# Patient Record
Sex: FEMALE | Race: WHITE | NOT HISPANIC OR LATINO | Employment: FULL TIME | URBAN - METROPOLITAN AREA
[De-identification: names, ages, dates, MRNs, and addresses within clinical notes are randomized per-mention and may not be internally consistent; named-entity substitution may affect disease eponyms.]

---

## 2017-02-17 ENCOUNTER — HISTORICAL (OUTPATIENT)
Dept: RADIOLOGY | Facility: HOSPITAL | Age: 37
End: 2017-02-17

## 2018-05-02 ENCOUNTER — HISTORICAL (OUTPATIENT)
Dept: RADIOLOGY | Facility: HOSPITAL | Age: 38
End: 2018-05-02

## 2019-10-31 ENCOUNTER — HISTORICAL (OUTPATIENT)
Dept: RADIOLOGY | Facility: HOSPITAL | Age: 39
End: 2019-10-31

## 2020-05-05 ENCOUNTER — HISTORICAL (OUTPATIENT)
Dept: ADMINISTRATIVE | Facility: HOSPITAL | Age: 40
End: 2020-05-05

## 2020-05-05 LAB
CRP SERPL-MCNC: 0.7 MG/DL
TSH SERPL-ACNC: 0.56 UIU/ML (ref 0.35–4.94)

## 2020-06-26 ENCOUNTER — HISTORICAL (OUTPATIENT)
Dept: LAB | Facility: HOSPITAL | Age: 40
End: 2020-06-26

## 2020-06-26 LAB — SARS-COV-2 RNA RESP QL NAA+PROBE: DETECTED

## 2020-07-02 ENCOUNTER — HISTORICAL (OUTPATIENT)
Dept: LAB | Facility: HOSPITAL | Age: 40
End: 2020-07-02

## 2020-08-20 ENCOUNTER — HISTORICAL (OUTPATIENT)
Dept: RADIOLOGY | Facility: HOSPITAL | Age: 40
End: 2020-08-20

## 2020-10-06 ENCOUNTER — HISTORICAL (OUTPATIENT)
Dept: LAB | Facility: HOSPITAL | Age: 40
End: 2020-10-06

## 2020-10-06 LAB
ALBUMIN SERPL-MCNC: 3.8 GM/DL (ref 3.4–5)
ALBUMIN/GLOB SERPL: 0.9 RATIO (ref 1.1–2)
ALP SERPL-CCNC: 51 UNIT/L (ref 46–116)
ALT SERPL-CCNC: 42 UNIT/L (ref 12–78)
AST SERPL-CCNC: 19 UNIT/L (ref 15–37)
BILIRUB SERPL-MCNC: 0.2 MG/DL (ref 0.2–1)
BILIRUBIN DIRECT+TOT PNL SERPL-MCNC: 0.09 MG/DL (ref 0–0.8)
BILIRUBIN DIRECT+TOT PNL SERPL-MCNC: 0.11 MG/DL (ref 0–0.2)
BUN SERPL-MCNC: 9.5 MG/DL (ref 7–18)
CALCIUM SERPL-MCNC: 9.5 MG/DL (ref 8.5–10.1)
CHLORIDE SERPL-SCNC: 104 MMOL/L (ref 98–107)
CO2 SERPL-SCNC: 26.5 MMOL/L (ref 21–32)
CREAT SERPL-MCNC: 0.64 MG/DL (ref 0.6–1.3)
ERYTHROCYTE [DISTWIDTH] IN BLOOD BY AUTOMATED COUNT: 12.2 % (ref 11.5–17)
GLOBULIN SER-MCNC: 4.1 GM/DL (ref 2.4–3.5)
GLUCOSE SERPL-MCNC: 94 MG/DL (ref 74–106)
HCT VFR BLD AUTO: 40.9 % (ref 37–47)
HGB BLD-MCNC: 13.6 GM/DL (ref 12–16)
MCH RBC QN AUTO: 29.5 PG (ref 27–31)
MCHC RBC AUTO-ENTMCNC: 33.3 GM/DL (ref 33–36)
MCV RBC AUTO: 88.7 FL (ref 80–94)
PLATELET # BLD AUTO: 385 X10(3)/MCL (ref 130–400)
PMV BLD AUTO: 8.6 FL (ref 9.4–12.4)
POTASSIUM SERPL-SCNC: 4.2 MMOL/L (ref 3.5–5.1)
PROT SERPL-MCNC: 7.9 GM/DL (ref 6.4–8.2)
RBC # BLD AUTO: 4.61 X10(6)/MCL (ref 4.2–5.4)
SODIUM SERPL-SCNC: 141 MMOL/L (ref 136–145)
WBC # SPEC AUTO: 5 X10(3)/MCL (ref 4.5–11.5)

## 2021-09-14 ENCOUNTER — HOSPITAL ENCOUNTER (OUTPATIENT)
Dept: NUTRITION | Facility: HOSPITAL | Age: 41
End: 2021-09-15
Attending: INTERNAL MEDICINE | Admitting: INTERNAL MEDICINE

## 2021-09-14 LAB
ABS NEUT (OLG): 3.62 X10(3)/MCL (ref 2.1–9.2)
ALBUMIN SERPL-MCNC: 4 GM/DL (ref 3.5–5)
ALBUMIN/GLOB SERPL: 0.9 RATIO (ref 1.1–2)
ALP SERPL-CCNC: 67 UNIT/L (ref 40–150)
ALT SERPL-CCNC: 44 UNIT/L (ref 0–55)
APTT PPP: 28.6 SECOND(S) (ref 23.2–33.7)
AST SERPL-CCNC: 27 UNIT/L (ref 5–34)
BASOPHILS # BLD AUTO: 0 X10(3)/MCL (ref 0–0.2)
BASOPHILS NFR BLD AUTO: 0 %
BILIRUB SERPL-MCNC: 0.4 MG/DL
BILIRUBIN DIRECT+TOT PNL SERPL-MCNC: 0.2 MG/DL (ref 0–0.5)
BILIRUBIN DIRECT+TOT PNL SERPL-MCNC: 0.2 MG/DL (ref 0–0.8)
BUN SERPL-MCNC: 9.1 MG/DL (ref 7–18.7)
CALCIUM SERPL-MCNC: 9.7 MG/DL (ref 8.4–10.2)
CHLORIDE SERPL-SCNC: 105 MMOL/L (ref 98–107)
CHOLEST SERPL-MCNC: 168 MG/DL
CHOLEST/HDLC SERPL: 3 {RATIO} (ref 0–5)
CK SERPL-CCNC: 66 U/L (ref 29–168)
CO2 SERPL-SCNC: 22 MMOL/L (ref 22–29)
CREAT SERPL-MCNC: 0.71 MG/DL (ref 0.55–1.02)
CRP SERPL HS-MCNC: 0.43 MG/DL
EOSINOPHIL # BLD AUTO: 0 X10(3)/MCL (ref 0–0.9)
EOSINOPHIL NFR BLD AUTO: 1 %
ERYTHROCYTE [DISTWIDTH] IN BLOOD BY AUTOMATED COUNT: 12.3 % (ref 11.5–17)
ERYTHROCYTE [SEDIMENTATION RATE] IN BLOOD: 22 MM/HR (ref 0–20)
EST. AVERAGE GLUCOSE BLD GHB EST-MCNC: 99.7 MG/DL
GLOBULIN SER-MCNC: 4.5 GM/DL (ref 2.4–3.5)
GLUCOSE SERPL-MCNC: 93 MG/DL (ref 74–100)
HBA1C MFR BLD: 5.1 %
HCT VFR BLD AUTO: 45.8 % (ref 37–47)
HDLC SERPL-MCNC: 49 MG/DL (ref 35–60)
HGB BLD-MCNC: 14.7 GM/DL (ref 12–16)
IMM GRANULOCYTES # BLD AUTO: 0.02 10*3/UL
IMM GRANULOCYTES NFR BLD AUTO: 0 %
INR PPP: 1 (ref 0–1.3)
LDLC SERPL CALC-MCNC: 103 MG/DL (ref 50–140)
LYMPHOCYTES # BLD AUTO: 1.9 X10(3)/MCL (ref 0.6–4.6)
LYMPHOCYTES NFR BLD AUTO: 32 %
MCH RBC QN AUTO: 29 PG (ref 27–31)
MCHC RBC AUTO-ENTMCNC: 32.1 GM/DL (ref 33–36)
MCV RBC AUTO: 90.3 FL (ref 80–94)
MONOCYTES # BLD AUTO: 0.4 X10(3)/MCL (ref 0.1–1.3)
MONOCYTES NFR BLD AUTO: 6 %
NEUTROPHILS # BLD AUTO: 3.62 X10(3)/MCL (ref 2.1–9.2)
NEUTROPHILS NFR BLD AUTO: 60 %
PLATELET # BLD AUTO: 280 X10(3)/MCL (ref 130–400)
PMV BLD AUTO: 10 FL (ref 9.4–12.4)
POTASSIUM SERPL-SCNC: 4.5 MMOL/L (ref 3.5–5.1)
PROT SERPL-MCNC: 8.5 GM/DL (ref 6.4–8.3)
PROTHROMBIN TIME: 12.4 SECOND(S) (ref 12.5–14.5)
RBC # BLD AUTO: 5.07 X10(6)/MCL (ref 4.2–5.4)
SARS-COV-2 AG RESP QL IA.RAPID: NEGATIVE
SODIUM SERPL-SCNC: 138 MMOL/L (ref 136–145)
TRIGL SERPL-MCNC: 78 MG/DL (ref 37–140)
TROPONIN I SERPL-MCNC: <0.01 NG/ML (ref 0–0.04)
VLDLC SERPL CALC-MCNC: 16 MG/DL
WBC # SPEC AUTO: 6 X10(3)/MCL (ref 4.5–11.5)

## 2021-09-15 LAB
ABS NEUT (OLG): 1.72 X10(3)/MCL (ref 2.1–9.2)
ALBUMIN SERPL-MCNC: 3.4 GM/DL (ref 3.5–5)
ALBUMIN/GLOB SERPL: 1 RATIO (ref 1.1–2)
ALP SERPL-CCNC: 59 UNIT/L (ref 40–150)
ALT SERPL-CCNC: 35 UNIT/L (ref 0–55)
AMPHET UR QL SCN: NEGATIVE
AST SERPL-CCNC: 21 UNIT/L (ref 5–34)
BARBITURATE SCN PRESENT UR: NEGATIVE
BASOPHILS # BLD AUTO: 0 X10(3)/MCL (ref 0–0.2)
BASOPHILS NFR BLD AUTO: 0 %
BENZODIAZ UR QL SCN: POSITIVE
BILIRUB SERPL-MCNC: 0.2 MG/DL
BILIRUBIN DIRECT+TOT PNL SERPL-MCNC: 0.1 MG/DL (ref 0–0.5)
BILIRUBIN DIRECT+TOT PNL SERPL-MCNC: 0.1 MG/DL (ref 0–0.8)
BUN SERPL-MCNC: 12.6 MG/DL (ref 7–18.7)
CALCIUM SERPL-MCNC: 8.8 MG/DL (ref 8.4–10.2)
CANNABINOIDS UR QL SCN: POSITIVE
CHLORIDE SERPL-SCNC: 107 MMOL/L (ref 98–107)
CO2 SERPL-SCNC: 22 MMOL/L (ref 22–29)
COCAINE UR QL SCN: NEGATIVE
CREAT SERPL-MCNC: 0.75 MG/DL (ref 0.55–1.02)
EOSINOPHIL # BLD AUTO: 0.1 X10(3)/MCL (ref 0–0.9)
EOSINOPHIL NFR BLD AUTO: 1 %
ERYTHROCYTE [DISTWIDTH] IN BLOOD BY AUTOMATED COUNT: 12.3 % (ref 11.5–17)
GLOBULIN SER-MCNC: 3.4 GM/DL (ref 2.4–3.5)
GLUCOSE SERPL-MCNC: 98 MG/DL (ref 74–100)
HCT VFR BLD AUTO: 39.3 % (ref 37–47)
HGB BLD-MCNC: 12.5 GM/DL (ref 12–16)
LYMPHOCYTES # BLD AUTO: 2.8 X10(3)/MCL (ref 0.6–4.6)
LYMPHOCYTES NFR BLD AUTO: 56 %
MCH RBC QN AUTO: 29.1 PG (ref 27–31)
MCHC RBC AUTO-ENTMCNC: 31.8 GM/DL (ref 33–36)
MCV RBC AUTO: 91.4 FL (ref 80–94)
MONOCYTES # BLD AUTO: 0.4 X10(3)/MCL (ref 0.1–1.3)
MONOCYTES NFR BLD AUTO: 9 %
NEUTROPHILS # BLD AUTO: 1.72 X10(3)/MCL (ref 2.1–9.2)
NEUTROPHILS NFR BLD AUTO: 34 %
OPIATES UR QL SCN: NEGATIVE
PCP UR QL: NEGATIVE
PH UR STRIP.AUTO: 6 [PH] (ref 5–7.5)
PLATELET # BLD AUTO: 347 X10(3)/MCL (ref 130–400)
PMV BLD AUTO: 8.9 FL (ref 9.4–12.4)
POTASSIUM SERPL-SCNC: 4.1 MMOL/L (ref 3.5–5.1)
PROT SERPL-MCNC: 6.8 GM/DL (ref 6.4–8.3)
RBC # BLD AUTO: 4.3 X10(6)/MCL (ref 4.2–5.4)
SODIUM SERPL-SCNC: 140 MMOL/L (ref 136–145)
SP GR FLD REFRACTOMETRY: 1.02 (ref 1–1.03)
WBC # SPEC AUTO: 5.1 X10(3)/MCL (ref 4.5–11.5)

## 2022-04-30 NOTE — ED PROVIDER NOTES
Patient:   Jose Chiu            MRN: [id number]            FIN: [id number]               Age:   41 years     Sex:  Female     :  1980   Associated Diagnoses:   Numbness in left leg; Arm numbness left   Author:   Anurag Lopez      Basic Information   Time seen: Date & time 2021 11:53:00.   History source: Patient.   Arrival mode: Private vehicle, wheelchair.   Additional information: Patient's physician(s): 1534: Assumed care MARY ANN EDWARDS.      History of Present Illness   The patient presents with 40 y/o F presents to the ED with dizziness onset this morning with associated left-sided numbness/tingling and nausea. Also c/o cough and headache. Denies chest pain, SOB, vomiting, fever. SAMANTHA Murguia.  The onset was 8am today .  The course/duration of symptoms is constant.  The character of symptoms is off-balance.  The degree at present is moderate.  The exacerbating factor is none.  The relieving factor is none.  Risk factors consist of none.  Prior episodes: none.  Therapy today: see nurses notes.  Associated symptoms: numbness to left arm and left leg.        Review of Systems   Constitutional symptoms:  No fever, no chills.    Respiratory symptoms:  No shortness of breath, no cough.    Cardiovascular symptoms:  No chest pain, no palpitations.    Gastrointestinal symptoms:  No vomiting, no diarrhea.    Musculoskeletal symptoms:  No back pain,    Neurologic symptoms:  Dizziness, numbness, no altered level of consciousness, no weakness.              Additional review of systems information: All other systems reviewed and otherwise negative.      Health Status   Allergies:    Allergic Reactions (Selected)  No Known Medication Allergies,    Allergies (1) Active Reaction  No Known Medication Allergies None Documented  .   Medications:  (Selected)   Prescriptions  Prescribed  Colace 100 mg oral capsule: 100 mg = 1 cap(s), Oral, BID, PRN PRN for constipation, # 60 cap(s), 0  Refill(s), Pharmacy: [facility] [place] #[id number]  GoLYTELY oral powder for reconstitution: 240 mL, Oral, Daily, drink 1/2 then wait 12 hours, if no BM then drink second 1/2, # 480 mL, 0 Refill(s), Pharmacy: [facility] #[id number]  Levsin 0.125 mg oral tablet: 0.125 mg = 1 tab(s), Oral, TID, PRN PRN for spasm, # 15 tab(s), 0 Refill(s), Pharmacy: [facility] #[id number], 160, cm, Height/Length Dosing, 20 8:42:00 CDT, 92, kg, Weight Dosing, 20 8:42:00 CDT  Zofran ODT 8 mg oral tablet, disintegratin mg = 1 tab(s), Oral, q8hr, PRN PRN nausea/vomiting, allow tablet to dissolve on tongue, # 15 tab(s), 0 Refill(s), Pharmacy: [facility] #[id number], 157, cm, Height/Length Dosing, 20 12:33:00 CDT, 88.5, kg, Weight Dosing, 20 12:33:00...  albuterol 2.5 mg/3 mL (0.083%) inhalation solution: 2.5 mg = 3 mL, NEB, q4hr Resp, # 540 mL, 0 Refill(s), Pharmacy: [facility] #[id number], 157.48, cm, Height/Length Dosing, 20 1:13:00 CST, 94.8, kg, Weight Dosing, 20 1:13:00 CST  docusate sodium 50 mg oral capsule: 50 mg = 1 cap(s), Oral, BID, PRN PRN for constipation, # 60 cap(s), 0 Refill(s), [id number], 157, cm, Height/Length Dosing, 10/23/20 16:04:00 CDT, 88, kg, Weight Dosing, 10/23/20 16:04:00 CDT  Documented Medications  Documented  DULoxetine 30 mg oral delayed release capsule: 90 mg = 3 cap(s), Oral, Daily, do not crush or chew, # 90 cap(s), 0 Refill(s)  DULoxetine 60 mg oral delayed release capsule: 60 mg = 1 cap(s), Oral, Daily  alPRAzolam 1 mg oral tablet: 1 mg = 1 tab(s), Oral, At Bedtime  amphetamine-dextroamphetamine 20 mg oral tablet: 10 mg = 0.5 tab(s), Oral, BID  cefdinir 300 mg oral capsule: 300 mg = 1 cap(s), Oral, BID  dextromethorphan-promethazine 15 mg-6.25 mg/5 mL oral syrup: 5 mL, Oral, q6hr  doxycycline hyclate 100 mg oral capsule: 100 mg = 1 cap(s), Oral, BID  methylPREDNISolone 4 mg oral tab: Oral, As Directed  oseltamivir 75 mg  oral capsule: 75 mg = 1 cap(s), Oral, BID, per nurse's notes.   Immunizations: Per nurse's notes.   Menstrual history: Per nurse's notes.      Past Medical/ Family/ Social History   Medical history:    Resolved  Anxiety ([id number]):  Resolved., Reviewed as documented in chart.   Surgical history:    No active procedure history items have been selected or recorded., Reviewed as documented in chart.   Family history:    No family history items have been selected or recorded., Reviewed as documented in chart.   Social history:    Social & Psychosocial Habits    Alcohol  01/01/2020  Use: Never    Tobacco  11/02/2019  Use: Never (less than 100 in l    Patient Wants Consult For Cessation Counseling N/A    01/01/2020  Use: Never (less than 100 in l    Patient Wants Consult For Cessation Counseling N/A    05/03/2020  Use: Never (less than 100 in l    Patient Wants Consult For Cessation Counseling N/A    06/28/2020  Use: Never (less than 100 in l    Patient Wants Consult For Cessation Counseling No    10/23/2020  Use: Never (less than 100 in l    Patient Wants Consult For Cessation Counseling N/A    09/14/2021  Use: Never (less than 100 in l    Patient Wants Consult For Cessation Counseling No    Abuse/Neglect  11/02/2019  SHX Any signs of abuse or neglect No    01/01/2020  SHX Any signs of abuse or neglect No    05/03/2020  SHX Any signs of abuse or neglect No    06/28/2020  SHX Any signs of abuse or neglect No    Feels unsafe at home: No    Safe place to go: Yes    10/23/2020  SHX Any signs of abuse or neglect No    09/14/2021  SHX Any signs of abuse or neglect No  , Reviewed as documented in chart.   Problem list:    Active Problems (2)  2019-nCoV   Obesity   , per nurse's notes.      Physical Examination               Vital Signs      No qualifying data available.   General:  Alert, no acute distress, well hydrated, Skin: Normal for ethnicity.    Skin:  Warm, dry, pink, intact.    Head:  Normocephalic.   Neck:  Supple, no  tenderness, full range of motion.    Eye:  Pupils are equal, round and reactive to light, extraocular movements are intact, normal conjunctiva.    Cardiovascular:  Regular rate and rhythm, No murmur, Normal peripheral perfusion.    Respiratory:  Lungs are clear to auscultation, breath sounds are equal, Respirations: not tachypneic, not labored, not shallow, Retractions: None.    Chest wall:  No tenderness.   Back:  Normal range of motion, Normal alignment, No costovertebral angle tenderness,    Musculoskeletal:  Normal ROM, normal strength, no swelling, no deformity.    Gastrointestinal:  Soft, Nontender, Non distended, Normal bowel sounds.    Neurological:  Alert and oriented to person, place, time, and situation, No focal neurological deficit observed, CN II-XII intact, normal sensory observed, normal motor observed, normal speech observed, normal coordination observed, Gait: Normal.    Psychiatric:  Cooperative, appropriate mood & affect, normal judgment.       Medical Decision Making   Documents reviewed:  Emergency department nurses' notes.   Orders  Launch Orders   Laboratory:  COVID-19  IDnow (Order): Stat collect, Nasal, 9/14/2021 11:58 CDT, Nurse collect  Troponin-I (Order): Stat collect, 9/14/2021 11:57 CDT, Blood, Lab Collect, Print Label By Order Location, 9/14/2021 11:57 CDT  CMP (Order): Stat collect, 9/14/2021 11:57 CDT, Blood, Lab Collect, Print Label By Order Location, 9/14/2021 11:57 CDT  CBC w/ Auto Diff (Order): Stat collect, 9/14/2021 11:57 CDT, Blood, Lab Collect, Print Label By Order Location, 9/14/2021 11:57 CDT  Radiology:  CT Head W/O Contrast (Order): Stat, 9/14/2021 11:58 CDT, Dizziness , vertigo, None, Stretcher, Rad Type, Schedule this test  CXR 1 View (Order): Stat, 9/14/2021 11:57 CDT, Shortness of Breath, None, Stretcher, Rad Type, Not Scheduled  Cardiology:  EKG (Order): 9/14/2021 11:57 CDT, Stat, Once, 9/14/2021 11:57 CDT, -1, -1, 9/14/2021 11:57 CDT, Launch Orders    Pharmacy:  aspirin 325 mg oral tablet (Order): 325 mg, form: Tab, Oral, Daily, first dose 9/14/2021 15:48 CDT, STAT, 4 chew tab = 5 grains  .    Electrocardiogram:  Time 9/14/2021 11:54:00, rate 94, normal sinus rhythm, No ST-T changes, no ectopy, normal MS & QRS intervals, EP Interp.    Results review:  Lab results : Lab View   9/14/2021 14:20 CDT      Sodium Lvl                138 mmol/L                             Potassium Lvl             4.5 mmol/L                             Chloride                  105 mmol/L                             CO2                       22 mmol/L                             Calcium Lvl               9.7 mg/dL                             Glucose Lvl               93 mg/dL                             BUN                       9.1 mg/dL                             Creatinine                0.71 mg/dL                             eGFR-AA                   >60  NA                             eGFR-FESTUS                  >60 mL/min/1.73 m2  NA                             Bili Total                0.4 mg/dL                             Bili Direct               0.2 mg/dL                             Bili Indirect             0.20 mg/dL                             AST                       27 unit/L                             ALT                       44 unit/L                             Alk Phos                  67 unit/L                             Total Protein             8.5 gm/dL  HI                             Albumin Lvl               4.0 gm/dL                             Globulin                  4.5 gm/dL  HI                             A/G Ratio                 0.9 ratio  LOW                             Troponin-I                <0.010 ng/mL                             WBC                       6.0 x10(3)/mcL                             RBC                       5.07 x10(6)/mcL                             Hgb                       14.7 gm/dL                             Hct                        45.8 %                             Platelet                  280 x10(3)/mcL                             MCV                       90.3 fL                             MCH                       29.0 pg                             MCHC                      32.1 gm/dL  LOW                             RDW                       12.3 %                             MPV                       10.0 fL                             Abs Neut                  3.62 x10(3)/mcL                             Neutro Auto               60 %  NA                             Lymph Auto                32 %  NA                             Mono Auto                 6 %  NA                             Eos Auto                  1 %  NA                             Abs Eos                   0.0 x10(3)/mcL                             Basophil Auto             0 %  NA                             Abs Neutro                3.62 x10(3)/mcL                             Abs Lymph                 1.9 x10(3)/mcL                             Abs Mono                  0.4 x10(3)/mcL                             Abs Baso                  0.0 x10(3)/mcL                             IG%                       0  NA                             IG#                       0.02  NA    9/14/2021 11:55 CDT      COVID-19 Rapid            NEGATIVE    ,    No qualifying data available, Interpretation Labs unremarkable.    Radiology results:  Rad Results (ST)  < 12 hrs   Accession: [id number]  Order: CT Head W/O Contrast  Report Dt/Tm: 09/14/2021 13:21  Report:   CT HEAD NONCONTRAST 9/14/2021     INDICATION: Dizziness , vertigo     TECHNIQUE: Multiple axial CT images were obtained from the cranial  vertex through the skull base without the administration of  intravenous contrast and reformatted in the coronal and sagittal  planes. Total  mGy*cm. Automated exposure control was utilized  for this examination as a radiation dose lowering technique.      COMPARISON: None available.        FINDINGS:  No acute hyperdense intracranial hemorrhage or abnormal  intra-axial/extra axial fluid collections. No focally hyperdense  intracranial vasculature, mass effect, or midline shift. The  craniocervical junction is within normal limits. No sulcal effacement  or focal loss of gray-white differentiation. The ventricular system is  normal in size and configuration. The basal cisterns are clear.     No acute fractures are identified of the calvarium or imaged facial  bones. Mild mucosal thickening of the bilateral sphenoid sinuses, left  greater than right. The remainder of the imaged paranasal sinuses,  mastoid air cells, and tympanic spaces are clear.        IMPRESSION:  No acute intracranial abnormalities.      Accession: [id number]  Order: XR Chest 1 View  Report Dt/Tm: 09/14/2021 12:24  Report:   Clinical History  Shortness of breath     Technique  Single view of the chest.     Comparison  6/28/2021     Findings  Lungs are clear with no visualized focal airspace opacity.  There is no evidence of pneumothorax or pleural effusion.  The cardiomediastinal silhouette is within normal limits.  No acute osseous abnormality.  The visualized abdomen is unremarkable.     Impression  No acute cardiopulmonary process.        .      Impression and Plan   Diagnosis   Numbness in left leg (IOD38-JL R20.0)   Arm numbness left (SMO63-MP R20.0)      Calls-Consults   -  9/14/2021 15:40:00 , Ramiro COOK, [place], recommends Tamanna accepts consult.    -  9/14/2021 15:53:00 , Kerri COOK, Zak PATEL, recommends Taina accepts admit.    Plan   Condition: Improved, Stable.    Disposition: Admit time  9/14/2021 15:55:00, Place in Observation Telemetry Unit.    Counseled: Patient, Family, Regarding diagnosis, Regarding diagnostic results, Regarding treatment plan, Patient indicated understanding of instructions.    Orders: Launch Orders   Consults:  Consult to Physician (Order): 9/14/2021 15:44 CDT,  "Ramiro COOK, Asma, TIA vs CVA, No  , Launch Orders   Admit/Transfer/Discharge:  Place in Outpatient Observation (Order): 9/14/2021 15:55 CDT, Remote Telemetry Kerri COOK, Zak PATEL, No, Neuro floor ok  .    Notes: Admitted in collaboration with Dr. Owen.       Addendum      Teaching-Supervisory Addendum-Brief   I participated in the following activities of this patients care: the medical history, the physical exam, medical decision making.   I personally performed: supervision of the patient's care, the medical history, the physical exam, the medical decision making.   The case was discussed with: the nurse practitioner.   Evaluation and management service: I agree with the evaluation and management decisions made in this patient's care.   Results interpretation: I agree with the study interpretation in this patient's care.   Notes: I conducted my own face-to-face evaluation of the patient and performed an independent history and physical examination of this patient. I discussed the MDM and reviewed the results with the NP.     Briefly, this is a 42 y/o F who presented tot he ED for evaluation of dizziness w/ "seeing spots", left sided upper and lower extremity numbness starting earlier today. No appreciable weakness or other focal neurologic deficit on examination. ED work up including CT head, EKG and labs unrevealing. Given unilateral symptoms concerning for possible TIA, case discussed with neurology who agreed w/ admission for further diagnostic evaluation. Findings and plan discussed with the patient, and she is agreeable to admission at this time.     Zena Owen MD  .   "

## 2022-05-04 NOTE — HISTORICAL [facility] CERNER
This is a historical note converted from [employer]. Formatting and pictures may have been removed.  Please reference [employer] for original formatting and attached multimedia. Chief Complaint  c/o headache, dizziness, and weakness that started while at work this morning. mild chest pain. denies pmh.  History of Present Illness  Patient is a 41-year-old  female?who presents to the ED with left-sided numbness and tingling patient reports she was at work this morning?when she became dizzy around 9 AM.? She states her vision?was spotted?and became very hot.? Soon after she reports tingling?throughout her body?and numbness to?the left upper?and lower extremity. ?States she was able to move?extremities but?could not feel anything. Symptoms have resolved but patient continues to have a headache. She denies a cardiac history and does not smoke.  ?  Upon presentation to the ED patient was afebrile and hemodynamically stable.? CBC and CMP were overall unremarkable.? Troponin less than 0.01.? COVID-19 rapid test negative.? EKG normal sinus rhythm with nonspecific T wave abnormalities.? Chest x-ray negative for acute cardiopulmonary process.? CT of head negative for acute intracranial abnormalities.? While in ED patient received aspirin and neurology was consulted.? Patient is admitted to hospital medicine services for further medical management.  Review of Systems  Except as documented all systems reviewed and negative  Physical Exam  Vitals & Measurements  T:?37? ?C (Oral)? HR:?76(Peripheral)? RR:?18? BP:?141/97? SpO2:?98%?  General:?NAD, nontoxic appearing, no family at bedside  Eye: PERRL clear conjunctiva  HENT:?moist mucus membranes, normocephalic  Neck: full range of motion  Respiratory:?clear to auscultation bilaterally, symmetrical chest expansion, unlabored respirations  Cardiovascular:?regular rate and rhythm, brisk capillary refill  Gastrointestinal:?soft, non-tender, non-distended  Genitourinary: no CVA  tenderness to palpation  Musculoskeletal:?full range of motion of all extremities, 5/5 strength to BUE and BLE  Integumentary: warm and dry  Neurologic: AAOx3, no focal deficits, symmetrical smile  Psych: calm, cooperative,?good eye contact, normal cognition  Assessment/Plan  IMPRESSION:  CVA vs TIA - likely TIA as symptoms resolved  Left sided numbness  ?   PLAN:  - Neurology consulted. Appreciate recommendations  - CVA work up:  > MRI Brain -negative for acute intracranial abnormalities  > CTA Head and Neck - pending  > Echo - pending  > Carotid US - No significant stenosis of right and left ICA  > Hemoglobin A1C - 5.1  > Lipid Panel - unremarkable  > Physical, Occupation and Speech Therapy consulted  - Labs in AM  ?  ?  DVT Prophylaxis: SCDs  ?  ?   Source of history: Patient and medical records  Present at bedside: Staff  Referral source: ED  History limitation: None  ?   PCP:Mare Mendes NP  ?  ?   I, YASMANY Don, reviewed and discussed the case with Dr. DAVID Manning. See addendum for further per MD.?  ?   Patient seen by me on 9/14/2021. ?Case was discussed with PA.? See my?note for attestation/plan.   Problem List/Past Medical History  Anxiety and depression  Procedure/Surgical History  Cholecystectomy  Medications  Inpatient  acetaminophen, 650 mg= 20.3 mL, Oral, q4hr, PRN  acetaminophen, 650 mg= 20.3 mL, Oral, q4hr, PRN  aspirin 81 mg oral tablet, CHEWABLE, 324 mg= 4 tab(s), Oral, Daily  hydrALAZINE 20 mg/mL injectable solution, 10 mg= 0.5 mL, IV Push, q4hr, PRN  labetalol, 10 mg= 2 mL, IV Push, q10min, PRN  Zofran, 4 mg= 2 mL, IV Push, q6hr, PRN  Home  albuterol 2.5 mg/3 mL (0.083%) inhalation solution, 2.5 mg= 3 mL, NEB, q4hr Resp,? ?Not Taking per Prescriber  alPRAzolam 1 mg oral tablet, 1 mg= 1 tab(s), Oral, At Bedtime  amphetamine-dextroamphetamine 20 mg oral tablet, 10 mg= 0.5 tab(s), Oral, BID,? ?Not Taking per Prescriber  cefdinir 300 mg oral capsule, 300 mg= 1 cap(s), Oral, BID,? ?Not Taking  per Prescriber  Colace 100 mg oral capsule, 100 mg= 1 cap(s), Oral, BID, PRN,? ?Not Taking per Prescriber  dextromethorphan-promethazine 15 mg-6.25 mg/5 mL oral syrup, 5 mL, Oral, q6hr,? ?Not Taking per Prescriber  docusate sodium 50 mg oral capsule, 50 mg= 1 cap(s), Oral, BID, PRN  doxycycline hyclate 100 mg oral capsule, 100 mg= 1 cap(s), Oral, BID,? ?Not Taking, Completed Rx  DULoxetine 30 mg oral delayed release capsule, 90 mg= 3 cap(s), Oral, Daily,? ?Not taking  DULoxetine 60 mg oral delayed release capsule, 60 mg= 1 cap(s), Oral, Daily  GoLYTELY oral powder for reconstitution, 240 mL, Oral, Daily,? ?Not taking  Levsin 0.125 mg oral tablet, 0.125 mg= 1 tab(s), Oral, TID, PRN,? ?Not taking  methylPREDNISolone 4 mg oral tab, Oral, As Directed,? ?Not Taking, Completed Rx  oseltamivir 75 mg oral capsule, 75 mg= 1 cap(s), Oral, BID,? ?Not Taking, Completed Rx  Zofran ODT 8 mg oral tablet, disintegrating, 8 mg= 1 tab(s), Oral, q8hr, PRN,? ?Not taking  Allergies  No Known Medication Allergies  Social History  Tobacco  Never (less than 100 in lifetime), No, 09/14/2021  Alcohol  ?? Monthly  Illicit Drugs  ?? Never  Family History  Mother: hypertension  Lab Results  Labs Last 24 Hours?  ?Chemistry? Hematology/Coagulation?   Sodium Lvl: 138 mmol/L (09/14/21 14:20:00) WBC: 6 x10(3)/mcL (09/14/21 14:20:00)   Potassium Lvl: 4.5 mmol/L (09/14/21 14:20:00) RBC: 5.07 x10(6)/mcL (09/14/21 14:20:00)   Chloride: 105 mmol/L (09/14/21 14:20:00) Hgb: 14.7 gm/dL (09/14/21 14:20:00)   CO2: 22 mmol/L (09/14/21 14:20:00) Hct: 45.8 % (09/14/21 14:20:00)   Calcium Lvl: 9.7 mg/dL (09/14/21 14:20:00) Platelet: 280 x10(3)/mcL (09/14/21 14:20:00)   Glucose Lvl: 93 mg/dL (09/14/21 14:20:00) MCV: 90.3 fL (09/14/21 14:20:00)   BUN: 9.1 mg/dL (09/14/21 14:20:00) MCH: 29 pg (09/14/21 14:20:00)   Creatinine: 0.71 mg/dL (09/14/21 14:20:00) MCHC:?32.1 gm/dL?Low (09/14/21 14:20:00)   eGFR-AA: >60 (09/14/21 14:20:00) RDW: 12.3 % (09/14/21 14:20:00)    eGFR-FESTUS: >60 (09/14/21 14:20:00) MPV: 10 fL (09/14/21 14:20:00)   Bili Total: 0.4 mg/dL (09/14/21 14:20:00) Abs Neut: 3.62 x10(3)/mcL (09/14/21 14:20:00)   Bili Direct: 0.2 mg/dL (09/14/21 14:20:00) Neutro Auto: 60 % (09/14/21 14:20:00)   Bili Indirect: 0.2 mg/dL (09/14/21 14:20:00) Lymph Auto: 32 % (09/14/21 14:20:00)   AST: 27 unit/L (09/14/21 14:20:00) Mono Auto: 6 % (09/14/21 14:20:00)   ALT: 44 unit/L (09/14/21 14:20:00) Eos Auto: 1 % (09/14/21 14:20:00)   Alk Phos: 67 unit/L (09/14/21 14:20:00) Abs Eos: 0 x10(3)/mcL (09/14/21 14:20:00)   Total Protein:?8.5 gm/dL?High (09/14/21 14:20:00) Basophil Auto: 0 % (09/14/21 14:20:00)   Albumin Lvl: 4 gm/dL (09/14/21 14:20:00) Abs Neutro: 3.62 x10(3)/mcL (09/14/21 14:20:00)   Globulin:?4.5 gm/dL?High (09/14/21 14:20:00) Abs Lymph: 1.9 x10(3)/mcL (09/14/21 14:20:00)   A/G Ratio:?0.9 ratio?Low (09/14/21 14:20:00) Abs Mono: 0.4 x10(3)/mcL (09/14/21 14:20:00)   Troponin-I: <0.010 (09/14/21 14:20:00) Abs Baso: 0 x10(3)/mcL (09/14/21 14:20:00)    IG%: 0 (09/14/21 14:20:00)    IG#: 0.02 (09/14/21 14:20:00)   Diagnostic Results  Accession:?[id number]  Order:?MRI Brain W/O Contrast  Report Dt/Tm:?09/14/2021 18:37  Report:?  EXAMINATION  MRI Brain W/O Contrast  ?  INDICATION  Dizziness, CVA evaluation  ?  Comparison: Noncontrast head CT dated 14 September, 2021  ?  TECHNIQUE  Multiplanar, multisequence MR images were obtained without the  intravenous administration of gadolinium-based contrast media.  ?  FINDINGS  Exam quality: adequate  ?  Parenchyma:  ?? ? No restricted diffusion or other suggestion of acute ischemic  insult.  ?? ? No mass, mass effect, or evidence of hemorrhage.  ?? ? Differentiation of the gray-white border is grossly preserved.  ?  Extra-axial Spaces: No abnormal fluid. The basal cisterns are  preserved.  Ventricles: Normal in size and configuration. No hydrocephalus.  Brain Sulci: Appropriate for patient age.  Midline Shift: None  appreciated.  ?  Posterior Fossa: Unremarkable.  Sella/Suprasellar Region: No abnormalities.  ?  Vasculature: Normal flow signal voids within the large intracranial  vessels.  Dural Sinuses: No focal abnormality.  ?  Scalp/Skull: No abnormalities.  Skull Base/Craniocervical Junction: Mastoid air cells are well  aerated. No focal abnormality.  ?  Paranasal Sinuses: Clear, with no fluid level.  ?  IMPRESSION  No acute intracranial abnormality.  ?  Accession:?[id number]  Order:?CT Head W/O Contrast  Report Dt/Tm:?09/14/2021 13:21  Report:?  CT HEAD NONCONTRAST 9/14/2021  ?  INDICATION: Dizziness , vertigo  ?  TECHNIQUE: Multiple axial CT images were obtained from the cranial  vertex through the skull base without the administration of  intravenous contrast and reformatted in the coronal and sagittal  planes. Total  mGy*cm. Automated exposure control was utilized  for this examination as a radiation dose lowering technique.  ?  COMPARISON: None available.  ?  ?  FINDINGS:  No acute hyperdense intracranial hemorrhage or abnormal  intra-axial/extra axial fluid collections. No focally hyperdense  intracranial vasculature, mass effect, or midline shift. The  craniocervical junction is within normal limits. No sulcal effacement  or focal loss of gray-white differentiation. The ventricular system is  normal in size and configuration. The basal cisterns are clear.  ?  No acute fractures are identified of the calvarium or imaged facial  bones. Mild mucosal thickening of the bilateral sphenoid sinuses, left  greater than right. The remainder of the imaged paranasal sinuses,  mastoid air cells, and tympanic spaces are clear.  ?  ?  IMPRESSION:  No acute intracranial abnormalities.  ?  ?  Accession:?[id number]  Order:?XR Chest 1 View  Report Dt/Tm:?09/14/2021 12:24  Report:?  Clinical History  Shortness of breath  ?  Technique  Single view of the chest.  ?  Comparison  6/28/2021  ?  Findings  Lungs are clear with no  visualized focal airspace opacity.  There is no evidence of pneumothorax or pleural effusion.  The cardiomediastinal silhouette is within normal limits.  No acute osseous abnormality.  The visualized abdomen is unremarkable.  ?  Impression  No acute cardiopulmonary process.  ?  ?

## 2022-05-04 NOTE — HISTORICAL [facility]
This is a historical note converted from [employer]. Formatting and pictures may have been removed.  Please reference [employer] for original formatting and attached multimedia. Admit and Discharge Dates  Admit Date: 09/14/2021  Discharge Date: 09/15/2021  Physicians  Attending Physician - serafin COOK  Admitting Physician - Kerri COOK, Zak PATEL  Consulting Physician - Ramiro COOK, Asmdick  Primary Care Physician - Mare Mendes NP  Discharge Diagnosis  Dizziness, HA, left-sided numbness -atypical ?Migraine ?  Anxiety  h/o COVID-19 (June 2020)  hyperlipidemia  Surgical Procedures  No procedures recorded for this visit.  Immunizations  No immunizations recorded for this visit.  Admission Information  Chief Complaint  c/o headache, dizziness, and weakness that started while at work this morning. mild chest pain. denies pmh.  History of Present Illness  Patient is a 41-year-old  female?who presents to the ED with left-sided numbness and tingling patient reports she was at work this morning?when she became dizzy around 9 AM.? She states her vision?was spotted?and became very hot.? Soon after she reports tingling?throughout her body?and numbness to?the left upper?and lower extremity. ?States she was able to move?extremities but?could not feel anything. Symptoms have resolved but patient continues to have a headache. She denies a cardiac history and does not smoke.  ?   Upon presentation to the ED patient was afebrile and hemodynamically stable.? CBC and CMP were overall unremarkable.? Troponin less than 0.01.? COVID-19 rapid test negative.? EKG normal sinus rhythm with nonspecific T wave abnormalities.? Chest x-ray negative for acute cardiopulmonary process.? CT of head negative for acute intracranial abnormalities.? While in ED patient received aspirin and neurology was consulted.? Patient is admitted to hospital medicine services for further medical management.  ?   Pt was seen by neurology and recs given. Pt was  discharged from PT as well as speech services. Pt had no complaints but still had a slight headache. Pt will be discharge home with follow up with neurology as well as philipp abrams/np. Pt was discharged in stable condition.? r/o atypical migraine  Time Spent on discharge  Discharge summary greater than 35 minutes  Objective  Physical Exam  General:?NAD, nontoxic appearing, no family at bedside  Eye: PERRL clear conjunctiva  HENT:?moist mucus membranes, normocephalic  Neck: full range of motion  Respiratory:?clear to auscultation bilaterally, symmetrical chest expansion, unlabored respirations  Cardiovascular:?regular rate and rhythm, brisk capillary refill  Gastrointestinal:?soft, non-tender, non-distended  Genitourinary: no CVA tenderness to palpation  Musculoskeletal:?full range of motion of all extremities, 5/5 strength to BUE and BLE  Integumentary: warm and dry  Neurologic: AAOx3, no focal deficits, symmetrical smile  Psych: calm, cooperative,?good eye contact, normal cognition  ?   Stroke workup in progress  -CTH:?No acute intracranial abnormalities  -CUS:?Negative  -MRI brain - neg  CTA head/neck- neg?  ECHO- nl/ ef >55%/ - bubble?  lipid panel- cho 168/ ldl 103  A1c: 5.1  -Not on antiplatelet, anticoagulation, or statin therapy?prior to hospital admission  Discharged from PT and Speech  ?  ?   Pt was discharged on asa81 mgs plus atorvastatin 40 mgs po daily  Follow up with Neurology in 3-5 days  ?follow up with 1 deya abrams/ np in 3-5 days.  Patient Discharge Condition  stable  Discharge Disposition  home with follow up with neurology as well as philipp abrams   Discharge Medication Reconciliation  Prescribed  aspirin (aspirin 81 mg oral tablet, CHEWABLE)?81 mg, Oral, Daily  atorvastatin (atorvastatin 40 mg oral tablet)?40 mg, Oral, Daily  Continue  DULoxetine (DULoxetine 30 mg oral delayed release capsule)?90 mg, Oral, Daily  alPRAzolam (alPRAzolam 1 mg oral tablet)?1 mg, Oral, At Bedtime  Discontinue  DULoxetine (DULoxetine  60 mg oral delayed release capsule)?60 mg, Oral, Daily  albuterol (albuterol 2.5 mg/3 mL (0.083%) inhalation solution)?2.5 mg, NEB, q4hr Resp  amphetamine-dextroamphetamine (amphetamine-dextroamphetamine 20 mg oral tablet)?10 mg, Oral, BID  cefdinir (cefdinir 300 mg oral capsule)?300 mg, Oral, BID  dextromethorphan-promethazine (dextromethorphan-promethazine 15 mg-6.25 mg/5 mL oral syrup)?5 mL, Oral, q6hr  docusate (Colace 100 mg oral capsule)?100 mg, Oral, BID, PRN for constipation  docusate (docusate sodium 50 mg oral capsule)?50 mg, Oral, BID, PRN for constipation  doxycycline (doxycycline hyclate 100 mg oral capsule)?100 mg, Oral, BID  hyoscyamine (Levsin 0.125 mg oral tablet)?0.125 mg, Oral, TID, PRN for spasm  methylPREDNISolone (methylPREDNISolone 4 mg oral tab), Oral, As Directed  ondansetron (Zofran ODT 8 mg oral tablet, disintegrating)?8 mg, Oral, q8hr, PRN nausea/vomiting  oseltamivir (oseltamivir 75 mg oral capsule)?75 mg, Oral, BID  polyethylene glycol 3350 WITH ELECTROLYTES (GoLYTELY oral powder for reconstitution)?240 mL, Oral, Daily  Education and Orders Provided  Migraine Headache  Discharge - 09/15/21 17:27:00 CDT, Home?  Follow up  Mare Rubio  ????Follow-up with PCP in 3 to 5 days  [employer]  No Qualifying Data

## 2022-05-21 NOTE — HISTORICAL [facility]
This is a historical note converted from [employer]. Formatting and pictures may have been removed.  Please reference [employer] for original formatting and attached multimedia. Chief Complaint  c/o headache, dizziness, and weakness that started while at work this morning. mild chest pain. denies pmh.  Reason for Consultation  CVA work-up  History of Present Illness  41-year-old female with no significant past medical history,?presented to ED on 9/14 with reports of dizziness, headache,?and?left-sided numbness.? She stated dizziness?began while she was driving to work?and continued?after she arrived.? While at work, she noticed?her left hand was numb?which prompted her to go to ED for further evaluation.? Dizziness and numbness?resolved?upon ED arrival, but still has?posterior headache.? CTh showed no acute intracranial abnormalities.? She was admitted to hospitalist and neurology was consulted for CVA work-up.  Review of Systems  Except as documented, all other systems reviewed and are negative  Physical Exam  Vitals & Measurements  T:?37? ?C (Oral)? HR:?80(Peripheral)? RR:?18? BP:?147/85? SpO2:?99%?  GENERAL: NAD, calm, cooperative, appropriate  RESP: CTAB, symmetric chest expansion  HEART: RRR, S1, S2, no LE edema  MENTAL STATUS:?Alert,?oriented x4, follows commands reliably  SPEECH/LANGUAGE: Clear, fluent, coherent  CN:  perr, EOMI, VFF, gaze conjugate  No tactile or motor facial asymmetry  Motor: LUE 4/5, LLE 4/5  RUE 5/5, RLE 5/5  Cerebellar: No tremor or dysmetria  Sensory: LUE and LLE?with?decreased sensation to tactile stimulation  Gait: not observed  Memory: normal and thought process intact  SKIN: warm, dry, intact?  Assessment/Plan  Impression and plan  Dizziness, HA, left-sided numbness - r/o CVA vs hemiplegic migraine  Anxiety  h/o COVID-19 (June 2020)  ?  Stroke workup in progress  -CTH:?No acute intracranial abnormalities  -CUS:?Negative  -MRI, CTA, ECHO, lipid panel, A1c: ordered  -Not on antiplatelet,  anticoagulation, or statin therapy?prior to hospital admission  ?   Allow permissive HTN ... prn Hydralazine and Labetalol for SBP >220 or DBP >120  Bedrest and HOB flat for 24 hours  NPO until passes [facility]  PT/OT/ST to evaluate after 24 hour bedrest completed  ?   Further recommendations may follow by MD.?   Problem List/Past Medical History  Ongoing  2019-nCoV  Obesity  Historical  Anxiety  Medications  Home  alPRAzolam 1 mg oral tablet, 1 mg= 1 tab(s), Oral, At Bedtime  docusate sodium 50 mg oral capsule, 50 mg= 1 cap(s), Oral, BID, PRN  DULoxetine 60 mg oral delayed release capsule, 60 mg= 1 cap(s), Oral, Daily  Allergies  No Known Medication Allergies  Social History  Abuse/Neglect  No, 09/14/2021  Alcohol  Never, 01/01/2020  Tobacco  Never (less than 100 in lifetime), No, 09/14/2021  Lab Results  Labs Last 24 Hours?  ?Chemistry? Hematology/Coagulation?   Sodium Lvl: 138 mmol/L (09/14/21 14:20:00) WBC: 6 x10(3)/mcL (09/14/21 14:20:00)   Potassium Lvl: 4.5 mmol/L (09/14/21 14:20:00) RBC: 5.07 x10(6)/mcL (09/14/21 14:20:00)   Chloride: 105 mmol/L (09/14/21 14:20:00) Hgb: 14.7 gm/dL (09/14/21 14:20:00)   CO2: 22 mmol/L (09/14/21 14:20:00) Hct: 45.8 % (09/14/21 14:20:00)   Calcium Lvl: 9.7 mg/dL (09/14/21 14:20:00) Platelet: 280 x10(3)/mcL (09/14/21 14:20:00)   Glucose Lvl: 93 mg/dL (09/14/21 14:20:00) MCV: 90.3 fL (09/14/21 14:20:00)   BUN: 9.1 mg/dL (09/14/21 14:20:00) MCH: 29 pg (09/14/21 14:20:00)   Creatinine: 0.71 mg/dL (09/14/21 14:20:00) MCHC:?32.1 gm/dL?Low (09/14/21 14:20:00)   eGFR-AA: >60 (09/14/21 14:20:00) RDW: 12.3 % (09/14/21 14:20:00)   eGFR-FESTUS: >60 (09/14/21 14:20:00) MPV: 10 fL (09/14/21 14:20:00)   Bili Total: 0.4 mg/dL (09/14/21 14:20:00) Abs Neut: 3.62 x10(3)/mcL (09/14/21 14:20:00)   Bili Direct: 0.2 mg/dL (09/14/21 14:20:00) Neutro Auto: 60 % (09/14/21 14:20:00)   Bili Indirect: 0.2 mg/dL (09/14/21 14:20:00) Lymph Auto: 32 % (09/14/21 14:20:00)   AST: 27 unit/L (09/14/21 14:20:00) Mono Auto:  6 % (09/14/21 14:20:00)   ALT: 44 unit/L (09/14/21 14:20:00) Eos Auto: 1 % (09/14/21 14:20:00)   Alk Phos: 67 unit/L (09/14/21 14:20:00) Abs Eos: 0 x10(3)/mcL (09/14/21 14:20:00)   Total Protein:?8.5 gm/dL?High (09/14/21 14:20:00) Basophil Auto: 0 % (09/14/21 14:20:00)   Albumin Lvl: 4 gm/dL (09/14/21 14:20:00) Abs Neutro: 3.62 x10(3)/mcL (09/14/21 14:20:00)   Globulin:?4.5 gm/dL?High (09/14/21 14:20:00) Abs Lymph: 1.9 x10(3)/mcL (09/14/21 14:20:00)   A/G Ratio:?0.9 ratio?Low (09/14/21 14:20:00) Abs Mono: 0.4 x10(3)/mcL (09/14/21 14:20:00)   Troponin-I: <0.010 (09/14/21 14:20:00) Abs Baso: 0 x10(3)/mcL (09/14/21 14:20:00)    IG%: 0 (09/14/21 14:20:00)    IG#: 0.02 (09/14/21 14:20:00)   Diagnostic Results  (09/14/2021 13:19 CDT CT Head W/O Contrast)  IMPRESSION:  No acute intracranial abnormalities. [1]     [1]?CT Head W/O Contrast; Khoi Willis MD 09/14/2021 13:19 CDT   Stroke workup negative for acute infarct  -CTH:?No acute intracranial abnormalities  -MRI:?No acute?infarct  -CTA h/n: unremarkable  -CUS:?Negative  -ECHO: EF 60-65%, bubble study negative  -LDL: 103  -A1c: 5.1  -Not on antiplatelet, anticoagulation, or statin therapy?prior to hospital admission  ?  ?   Patient needs to?follow-up with general neurology?for suspected migraine with aura

## 2023-03-06 ENCOUNTER — HOSPITAL ENCOUNTER (EMERGENCY)
Facility: HOSPITAL | Age: 43
Discharge: LEFT BEFORE DISCHARGE | End: 2023-03-07
Payer: COMMERCIAL

## 2023-03-06 VITALS
HEART RATE: 101 BPM | TEMPERATURE: 99 F | HEIGHT: 62 IN | DIASTOLIC BLOOD PRESSURE: 89 MMHG | BODY MASS INDEX: 34.78 KG/M2 | WEIGHT: 189 LBS | SYSTOLIC BLOOD PRESSURE: 130 MMHG | RESPIRATION RATE: 20 BRPM | OXYGEN SATURATION: 100 %

## 2023-03-06 DIAGNOSIS — R10.10 UPPER ABDOMINAL PAIN: ICD-10-CM

## 2023-03-06 LAB
ALBUMIN SERPL-MCNC: 3.7 G/DL (ref 3.5–5)
ALBUMIN/GLOB SERPL: 1 RATIO (ref 1.1–2)
ALP SERPL-CCNC: 45 UNIT/L (ref 40–150)
ALT SERPL-CCNC: 21 UNIT/L (ref 0–55)
AST SERPL-CCNC: 14 UNIT/L (ref 5–34)
BASOPHILS # BLD AUTO: 0.02 X10(3)/MCL (ref 0–0.2)
BASOPHILS NFR BLD AUTO: 0.3 %
BILIRUBIN DIRECT+TOT PNL SERPL-MCNC: 0.8 MG/DL
BUN SERPL-MCNC: 7.7 MG/DL (ref 7–18.7)
CALCIUM SERPL-MCNC: 9.1 MG/DL (ref 8.4–10.2)
CHLORIDE SERPL-SCNC: 107 MMOL/L (ref 98–107)
CO2 SERPL-SCNC: 23 MMOL/L (ref 22–29)
CREAT SERPL-MCNC: 0.76 MG/DL (ref 0.55–1.02)
EOSINOPHIL # BLD AUTO: 0.02 X10(3)/MCL (ref 0–0.9)
EOSINOPHIL NFR BLD AUTO: 0.3 %
ERYTHROCYTE [DISTWIDTH] IN BLOOD BY AUTOMATED COUNT: 12.4 % (ref 11.5–17)
GFR SERPLBLD CREATININE-BSD FMLA CKD-EPI: >60 MLS/MIN/1.73/M2
GLOBULIN SER-MCNC: 3.7 GM/DL (ref 2.4–3.5)
GLUCOSE SERPL-MCNC: 90 MG/DL (ref 74–100)
HCT VFR BLD AUTO: 40.3 % (ref 37–47)
HGB BLD-MCNC: 13.2 G/DL (ref 12–16)
IMM GRANULOCYTES # BLD AUTO: 0.02 X10(3)/MCL (ref 0–0.04)
IMM GRANULOCYTES NFR BLD AUTO: 0.3 %
LIPASE SERPL-CCNC: 21 U/L
LYMPHOCYTES # BLD AUTO: 0.78 X10(3)/MCL (ref 0.6–4.6)
LYMPHOCYTES NFR BLD AUTO: 10.8 %
MCH RBC QN AUTO: 29.1 PG
MCHC RBC AUTO-ENTMCNC: 32.8 G/DL (ref 33–36)
MCV RBC AUTO: 88.8 FL (ref 80–94)
MONOCYTES # BLD AUTO: 0.35 X10(3)/MCL (ref 0.1–1.3)
MONOCYTES NFR BLD AUTO: 4.9 %
NEUTROPHILS # BLD AUTO: 6 X10(3)/MCL (ref 2.1–9.2)
NEUTROPHILS NFR BLD AUTO: 83.4 %
NRBC BLD AUTO-RTO: 0 %
PLATELET # BLD AUTO: 327 X10(3)/MCL (ref 130–400)
PMV BLD AUTO: 9.1 FL (ref 7.4–10.4)
POTASSIUM SERPL-SCNC: 3.7 MMOL/L (ref 3.5–5.1)
PROT SERPL-MCNC: 7.4 GM/DL (ref 6.4–8.3)
RBC # BLD AUTO: 4.54 X10(6)/MCL (ref 4.2–5.4)
SODIUM SERPL-SCNC: 138 MMOL/L (ref 136–145)
TROPONIN I SERPL-MCNC: <0.01 NG/ML (ref 0–0.04)
WBC # SPEC AUTO: 7.2 X10(3)/MCL (ref 4.5–11.5)

## 2023-03-06 PROCEDURE — 80053 COMPREHEN METABOLIC PANEL: CPT | Performed by: NURSE PRACTITIONER

## 2023-03-06 PROCEDURE — 84484 ASSAY OF TROPONIN QUANT: CPT | Performed by: NURSE PRACTITIONER

## 2023-03-06 PROCEDURE — 99284 EMERGENCY DEPT VISIT MOD MDM: CPT

## 2023-03-06 PROCEDURE — 83690 ASSAY OF LIPASE: CPT | Performed by: NURSE PRACTITIONER

## 2023-03-06 PROCEDURE — 93005 ELECTROCARDIOGRAM TRACING: CPT

## 2023-03-06 PROCEDURE — 85025 COMPLETE CBC W/AUTO DIFF WBC: CPT | Performed by: NURSE PRACTITIONER

## 2023-03-06 PROCEDURE — 93010 EKG 12-LEAD: ICD-10-PCS | Mod: ,,, | Performed by: STUDENT IN AN ORGANIZED HEALTH CARE EDUCATION/TRAINING PROGRAM

## 2023-03-06 PROCEDURE — 93010 ELECTROCARDIOGRAM REPORT: CPT | Mod: ,,, | Performed by: STUDENT IN AN ORGANIZED HEALTH CARE EDUCATION/TRAINING PROGRAM

## 2023-03-06 RX ORDER — ONDANSETRON 2 MG/ML
4 INJECTION INTRAMUSCULAR; INTRAVENOUS
Status: ACTIVE | OUTPATIENT
Start: 2023-03-06 | End: 2023-03-07

## 2023-03-06 NOTE — FIRST PROVIDER EVALUATION
Medical screening examination initiated.  I have conducted a focused provider triage encounter, findings are as follows:    Brief history of present illness:  41 y/o female presents with sudden onset of epigastric pain radiates to back, burning sensation. Feels it going up her neck as well. Cholecystectomy done 1-2 years ago.     There were no vitals filed for this visit.    Pertinent physical exam:  alert, nonlabored, appears uncomfortable, ambulatory    Brief workup plan:  ekg, labs, urine    Preliminary workup initiated; this workup will be continued and followed by the physician or advanced practice provider that is assigned to the patient when roomed.

## 2023-04-24 ENCOUNTER — HOSPITAL ENCOUNTER (OUTPATIENT)
Dept: RADIOLOGY | Facility: HOSPITAL | Age: 43
Discharge: HOME | End: 2023-04-24
Attending: NURSE PRACTITIONER
Payer: COMMERCIAL

## 2023-04-24 DIAGNOSIS — R06.2 WHEEZING: ICD-10-CM

## 2023-04-24 DIAGNOSIS — R05.1 ACUTE COUGH: ICD-10-CM

## 2023-04-24 DIAGNOSIS — R05.1 ACUTE COUGH: Primary | ICD-10-CM

## 2023-04-24 PROCEDURE — 71046 X-RAY EXAM CHEST 2 VIEWS: CPT | Mod: TC

## 2023-11-13 ENCOUNTER — HOSPITAL ENCOUNTER (OUTPATIENT)
Dept: RADIOLOGY | Facility: HOSPITAL | Age: 43
Discharge: HOME | End: 2023-11-13
Attending: INTERNAL MEDICINE
Payer: COMMERCIAL

## 2023-11-13 DIAGNOSIS — F41.1 GENERALIZED ANXIETY DISORDER: ICD-10-CM

## 2023-11-13 DIAGNOSIS — K59.09 OTHER CONSTIPATION: ICD-10-CM

## 2023-11-13 DIAGNOSIS — R14.0 ABDOMINAL BLOATING: ICD-10-CM

## 2023-11-13 DIAGNOSIS — E66.9 OBESITY, UNSPECIFIED CLASSIFICATION, UNSPECIFIED OBESITY TYPE, UNSPECIFIED WHETHER SERIOUS COMORBIDITY PRESENT: ICD-10-CM

## 2023-11-13 PROCEDURE — 74018 RADEX ABDOMEN 1 VIEW: CPT | Mod: TC

## 2023-11-16 ENCOUNTER — HOSPITAL ENCOUNTER (OUTPATIENT)
Dept: RADIOLOGY | Facility: HOSPITAL | Age: 43
Discharge: HOME | End: 2023-11-16
Attending: INTERNAL MEDICINE
Payer: COMMERCIAL

## 2023-11-16 DIAGNOSIS — F41.1 GENERALIZED ANXIETY DISORDER: ICD-10-CM

## 2023-11-16 DIAGNOSIS — R14.0 GASTRIC TYMPANY: ICD-10-CM

## 2023-11-16 DIAGNOSIS — K59.09 OTHER CONSTIPATION: ICD-10-CM

## 2023-11-16 PROCEDURE — 74018 RADEX ABDOMEN 1 VIEW: CPT | Mod: TC

## 2023-11-17 ENCOUNTER — HOSPITAL ENCOUNTER (OUTPATIENT)
Dept: RADIOLOGY | Facility: HOSPITAL | Age: 43
Discharge: HOME | End: 2023-11-17
Attending: INTERNAL MEDICINE
Payer: COMMERCIAL

## 2023-11-17 DIAGNOSIS — K59.09 OTHER CONSTIPATION: ICD-10-CM

## 2023-11-17 DIAGNOSIS — F41.1 GENERALIZED ANXIETY DISORDER: ICD-10-CM

## 2023-11-17 DIAGNOSIS — R14.0 GASTRIC TYMPANY: ICD-10-CM

## 2023-11-17 PROCEDURE — 74018 RADEX ABDOMEN 1 VIEW: CPT | Mod: TC

## 2024-07-22 ENCOUNTER — HOSPITAL ENCOUNTER (EMERGENCY)
Facility: HOSPITAL | Age: 44
Discharge: HOME | End: 2024-07-22
Attending: FAMILY MEDICINE
Payer: COMMERCIAL

## 2024-07-22 VITALS
RESPIRATION RATE: 20 BRPM | DIASTOLIC BLOOD PRESSURE: 89 MMHG | SYSTOLIC BLOOD PRESSURE: 143 MMHG | HEART RATE: 78 BPM | OXYGEN SATURATION: 100 % | TEMPERATURE: 98 F

## 2024-07-22 DIAGNOSIS — R11.2 NAUSEA & VOMITING: ICD-10-CM

## 2024-07-22 DIAGNOSIS — A08.4 VIRAL GASTROENTERITIS: Primary | ICD-10-CM

## 2024-07-22 LAB
ALBUMIN SERPL-MCNC: 3.5 G/DL (ref 3.5–5)
ALBUMIN/GLOB SERPL: 0.9 RATIO (ref 1.1–2)
ALP SERPL-CCNC: 45 UNIT/L (ref 40–150)
ALT SERPL-CCNC: 23 UNIT/L (ref 0–55)
AMPHET UR QL SCN: NEGATIVE
ANION GAP SERPL CALC-SCNC: 7 MEQ/L
AST SERPL-CCNC: 24 UNIT/L (ref 5–34)
B-HCG UR QL: NEGATIVE
BACTERIA #/AREA URNS AUTO: ABNORMAL /HPF
BARBITURATE SCN PRESENT UR: NEGATIVE
BASOPHILS # BLD AUTO: 0.01 X10(3)/MCL
BASOPHILS NFR BLD AUTO: 0.2 %
BENZODIAZ UR QL SCN: POSITIVE
BILIRUB SERPL-MCNC: 0.8 MG/DL
BILIRUB UR QL STRIP.AUTO: NEGATIVE
BUN SERPL-MCNC: 11.5 MG/DL (ref 7–18.7)
CALCIUM SERPL-MCNC: 9.2 MG/DL (ref 8.4–10.2)
CANNABINOIDS UR QL SCN: NEGATIVE
CHLORIDE SERPL-SCNC: 105 MMOL/L (ref 98–107)
CLARITY UR: CLEAR
CO2 SERPL-SCNC: 28 MMOL/L (ref 22–29)
COCAINE UR QL SCN: NEGATIVE
COLOR UR AUTO: YELLOW
CREAT SERPL-MCNC: 0.81 MG/DL (ref 0.55–1.02)
CREAT/UREA NIT SERPL: 14
CRP SERPL HS-MCNC: 2.59 MG/L
EOSINOPHIL # BLD AUTO: 0.04 X10(3)/MCL (ref 0–0.9)
EOSINOPHIL NFR BLD AUTO: 0.9 %
ERYTHROCYTE [DISTWIDTH] IN BLOOD BY AUTOMATED COUNT: 12.5 % (ref 11.5–17)
FLUAV AG UPPER RESP QL IA.RAPID: NOT DETECTED
FLUBV AG UPPER RESP QL IA.RAPID: NOT DETECTED
GFR SERPLBLD CREATININE-BSD FMLA CKD-EPI: >60 ML/MIN/1.73/M2
GLOBULIN SER-MCNC: 4 GM/DL (ref 2.4–3.5)
GLUCOSE SERPL-MCNC: 98 MG/DL (ref 74–100)
GLUCOSE UR QL STRIP: NEGATIVE
HCT VFR BLD AUTO: 40.7 % (ref 37–47)
HGB BLD-MCNC: 13.4 G/DL (ref 12–16)
HGB UR QL STRIP: ABNORMAL
IMM GRANULOCYTES # BLD AUTO: 0 X10(3)/MCL (ref 0–0.04)
IMM GRANULOCYTES NFR BLD AUTO: 0 %
KETONES UR QL STRIP: >=160
LEUKOCYTE ESTERASE UR QL STRIP: NEGATIVE
LYMPHOCYTES # BLD AUTO: 2.04 X10(3)/MCL (ref 0.6–4.6)
LYMPHOCYTES NFR BLD AUTO: 48.2 %
MAGNESIUM SERPL-MCNC: 1.6 MG/DL (ref 1.6–2.6)
MCH RBC QN AUTO: 30.1 PG (ref 27–31)
MCHC RBC AUTO-ENTMCNC: 32.9 G/DL (ref 33–36)
MCV RBC AUTO: 91.5 FL (ref 80–94)
MONOCYTES # BLD AUTO: 0.31 X10(3)/MCL (ref 0.1–1.3)
MONOCYTES NFR BLD AUTO: 7.3 %
MUCOUS THREADS URNS QL MICRO: ABNORMAL /LPF
NEUTROPHILS # BLD AUTO: 1.83 X10(3)/MCL (ref 2.1–9.2)
NEUTROPHILS NFR BLD AUTO: 43.4 %
NITRITE UR QL STRIP: NEGATIVE
OPIATES UR QL SCN: NEGATIVE
PCP UR QL: NEGATIVE
PH UR STRIP: 6.5 [PH]
PH UR: 6.5 [PH] (ref 3–11)
PLATELET # BLD AUTO: 328 X10(3)/MCL (ref 130–400)
PMV BLD AUTO: 8.5 FL (ref 7.4–10.4)
POTASSIUM SERPL-SCNC: 3.3 MMOL/L (ref 3.5–5.1)
PROT SERPL-MCNC: 7.5 GM/DL (ref 6.4–8.3)
PROT UR QL STRIP: NEGATIVE
RBC # BLD AUTO: 4.45 X10(6)/MCL (ref 4.2–5.4)
RBC #/AREA URNS AUTO: ABNORMAL /HPF
SARS-COV-2 RNA RESP QL NAA+PROBE: NOT DETECTED
SODIUM SERPL-SCNC: 140 MMOL/L (ref 136–145)
SP GR UR STRIP.AUTO: 1.02 (ref 1–1.03)
SPECIFIC GRAVITY, URINE AUTO (.000) (OHS): 1.02 (ref 1–1.03)
SQUAMOUS #/AREA URNS AUTO: ABNORMAL /HPF
UROBILINOGEN UR STRIP-ACNC: 0.2
WBC # BLD AUTO: 4.23 X10(3)/MCL (ref 4.5–11.5)
WBC #/AREA URNS AUTO: ABNORMAL /HPF

## 2024-07-22 PROCEDURE — 86141 C-REACTIVE PROTEIN HS: CPT | Performed by: FAMILY MEDICINE

## 2024-07-22 PROCEDURE — 96374 THER/PROPH/DIAG INJ IV PUSH: CPT

## 2024-07-22 PROCEDURE — 80307 DRUG TEST PRSMV CHEM ANLYZR: CPT | Performed by: FAMILY MEDICINE

## 2024-07-22 PROCEDURE — 85025 COMPLETE CBC W/AUTO DIFF WBC: CPT | Performed by: FAMILY MEDICINE

## 2024-07-22 PROCEDURE — 96361 HYDRATE IV INFUSION ADD-ON: CPT

## 2024-07-22 PROCEDURE — 81025 URINE PREGNANCY TEST: CPT | Performed by: FAMILY MEDICINE

## 2024-07-22 PROCEDURE — 80053 COMPREHEN METABOLIC PANEL: CPT | Performed by: FAMILY MEDICINE

## 2024-07-22 PROCEDURE — 63600175 PHARM REV CODE 636 W HCPCS: Performed by: FAMILY MEDICINE

## 2024-07-22 PROCEDURE — 83735 ASSAY OF MAGNESIUM: CPT | Performed by: FAMILY MEDICINE

## 2024-07-22 PROCEDURE — 96375 TX/PRO/DX INJ NEW DRUG ADDON: CPT

## 2024-07-22 PROCEDURE — 99284 EMERGENCY DEPT VISIT MOD MDM: CPT | Mod: 25

## 2024-07-22 PROCEDURE — 81003 URINALYSIS AUTO W/O SCOPE: CPT | Performed by: FAMILY MEDICINE

## 2024-07-22 PROCEDURE — 0240U COVID/FLU A&B PCR: CPT | Performed by: FAMILY MEDICINE

## 2024-07-22 RX ORDER — ONDANSETRON HYDROCHLORIDE 2 MG/ML
4 INJECTION, SOLUTION INTRAVENOUS
Status: COMPLETED | OUTPATIENT
Start: 2024-07-22 | End: 2024-07-22

## 2024-07-22 RX ORDER — METOCLOPRAMIDE HYDROCHLORIDE 5 MG/ML
10 INJECTION INTRAMUSCULAR; INTRAVENOUS
Status: COMPLETED | OUTPATIENT
Start: 2024-07-22 | End: 2024-07-22

## 2024-07-22 RX ORDER — ONDANSETRON 4 MG/1
4 TABLET, ORALLY DISINTEGRATING ORAL EVERY 6 HOURS PRN
Qty: 12 TABLET | Refills: 0 | Status: SHIPPED | OUTPATIENT
Start: 2024-07-22 | End: 2024-07-24

## 2024-07-22 RX ORDER — DIPHENHYDRAMINE HYDROCHLORIDE 50 MG/ML
25 INJECTION INTRAMUSCULAR; INTRAVENOUS
Status: COMPLETED | OUTPATIENT
Start: 2024-07-22 | End: 2024-07-22

## 2024-07-22 RX ADMIN — DIPHENHYDRAMINE HYDROCHLORIDE 25 MG: 50 INJECTION, SOLUTION INTRAMUSCULAR; INTRAVENOUS at 08:07

## 2024-07-22 RX ADMIN — SODIUM CHLORIDE, POTASSIUM CHLORIDE, SODIUM LACTATE AND CALCIUM CHLORIDE 1000 ML: 600; 310; 30; 20 INJECTION, SOLUTION INTRAVENOUS at 09:07

## 2024-07-22 RX ADMIN — METOCLOPRAMIDE 10 MG: 5 INJECTION, SOLUTION INTRAMUSCULAR; INTRAVENOUS at 08:07

## 2024-07-22 RX ADMIN — SODIUM CHLORIDE, POTASSIUM CHLORIDE, SODIUM LACTATE AND CALCIUM CHLORIDE 1000 ML: 600; 310; 30; 20 INJECTION, SOLUTION INTRAVENOUS at 11:07

## 2024-07-22 RX ADMIN — ONDANSETRON 4 MG: 2 INJECTION INTRAMUSCULAR; INTRAVENOUS at 08:07

## 2024-07-22 NOTE — ED PROVIDER NOTES
Encounter Date: 7/22/2024       History     Chief Complaint   Patient presents with    Nausea    Emesis     Nausea and vomiting since 0700 yesterday with intractable headache.     43-year-old female presents with a bowel leak so we cramps diarrhea still yesterday no fevers no chills vital signs were stable physical exam is unremarkable        Review of patient's allergies indicates:  No Known Allergies  History reviewed. No pertinent past medical history.  History reviewed. No pertinent surgical history.  No family history on file.     Review of Systems   Constitutional:  Negative for fever.   HENT:  Negative for sore throat.    Respiratory:  Negative for shortness of breath.    Cardiovascular:  Negative for chest pain.   Gastrointestinal:  Positive for abdominal pain, diarrhea, nausea and vomiting.   Genitourinary:  Negative for dysuria.   Musculoskeletal:  Negative for back pain.   Skin:  Negative for rash.   Neurological:  Negative for weakness.   Hematological:  Does not bruise/bleed easily.   All other systems reviewed and are negative.      Physical Exam     Initial Vitals [07/22/24 0847]   BP Pulse Resp Temp SpO2   (!) 150/101 84 20 97.6 °F (36.4 °C) 100 %      MAP       --         Physical Exam    Vitals reviewed.  Constitutional: She appears well-developed and well-nourished. She is active.   HENT:   Head: Normocephalic and atraumatic.   Eyes: Conjunctivae, EOM and lids are normal. Pupils are equal, round, and reactive to light.   Neck: Trachea normal and phonation normal. Neck supple. No thyroid mass present.   Normal range of motion.  Cardiovascular:  Normal rate, regular rhythm, normal heart sounds and normal pulses.           Pulmonary/Chest: Breath sounds normal.   Abdominal: Abdomen is soft. Bowel sounds are normal.   Musculoskeletal:         General: Normal range of motion.      Cervical back: Normal range of motion and neck supple.     Neurological: She is alert and oriented to person, place, and  time. She has normal strength and normal reflexes.   Skin: Skin is warm and intact.   Psychiatric: She has a normal mood and affect. Her speech is normal and behavior is normal. Judgment and thought content normal. Cognition and memory are normal.         ED Course   Procedures  Labs Reviewed   COMPREHENSIVE METABOLIC PANEL - Abnormal       Result Value    Sodium 140      Potassium 3.3 (*)     Chloride 105      CO2 28      Glucose 98      Blood Urea Nitrogen 11.5      Creatinine 0.81      Calcium 9.2      Protein Total 7.5      Albumin 3.5      Globulin 4.0 (*)     Albumin/Globulin Ratio 0.9 (*)     Bilirubin Total 0.8      ALP 45      ALT 23      AST 24      eGFR >60      Anion Gap 7.0      BUN/Creatinine Ratio 14     DRUG SCREEN, URINE (BEAKER) - Abnormal    Amphetamines, Urine Negative      Barbiturates, Urine Negative      Benzodiazepine, Urine Positive (*)     Cannabinoids, Urine Negative      Cocaine, Urine Negative      Opiates, Urine Negative      Phencyclidine, Urine Negative      pH, Urine 6.5      Specific Gravity, Urine Auto 1.020      Narrative:     Cut off concentrations:    Amphetamines - 1000 ng/ml  Barbiturates - 200 ng/ml  Benzodiazepine - 200 ng/ml  Cannabinoids (THC) - 50 ng/ml  Cocaine - 300 ng/ml  Fentanyl - 1.0 ng/ml  MDMA - 500 ng/ml  Opiates - 300 ng/ml   Phencyclidine (PCP) - 25 ng/ml    Specimen submitted for drug analysis and tested for pH and specific gravity in order to evaluate sample integrity. Suspect tampering if specific gravity is <1.003 and/or pH is not within the range of 4.5 - 8.0  False negatives may result form substances such as bleach added to urine.  False positives may result for the presence of a substance with similar chemical structure to the drug or its metabolite.    This test provides only a PRELIMINARY analytical test result. A more specific alternate chemical method must be used in order to obtain a confirmed analytical result. Gas chromatography/mass spectrometry  (GC/MS) is the preferred confirmatory method. Other chemical confirmation methods are available. Clinical consideration and professional judgement should be applied to any drug of abuse test result, particularly when preliminary positive results are used.    Positive results will be confirmed only at the physicians request. Unconfirmed screening results are to be used only for medical purposes (treatment).        URINALYSIS, REFLEX TO URINE CULTURE - Abnormal    Color, UA Yellow      Appearance, UA Clear      Specific Gravity, UA 1.020      pH, UA 6.5      Protein, UA Negative      Glucose, UA Negative      Ketones, UA >=160 (*)     Blood, UA Trace-Lysed (*)     Bilirubin, UA Negative      Urobilinogen, UA 0.2      Nitrites, UA Negative      Leukocyte Esterase, UA Negative     CBC WITH DIFFERENTIAL - Abnormal    WBC 4.23 (*)     RBC 4.45      Hgb 13.4      Hct 40.7      MCV 91.5      MCH 30.1      MCHC 32.9 (*)     RDW 12.5      Platelet 328      MPV 8.5      Neut % 43.4      Lymph % 48.2      Mono % 7.3      Eos % 0.9      Basophil % 0.2      Lymph # 2.04      Neut # 1.83 (*)     Mono # 0.31      Eos # 0.04      Baso # 0.01      IG# 0.00      IG% 0.0     URINALYSIS, MICROSCOPIC - Abnormal    Bacteria, UA Few (*)     Mucous, UA Trace (*)     RBC, UA 0-2      WBC, UA None Seen      Squamous Epithelial Cells, UA Moderate (*)    HIGH SENSITIVITY CRP - Normal    C-Reactive Protein High Sensitivity 2.59     PREGNANCY TEST, URINE RAPID - Normal    hCG Qualitative, Urine Negative     MAGNESIUM - Normal    Magnesium Level 1.60     COVID/FLU A&B PCR - Normal    Influenza A PCR Not Detected      Influenza B PCR Not Detected      SARS-CoV-2 PCR Not Detected      Narrative:     The Xpert Xpress SARS-CoV-2/FLU/RSV plus is a rapid, multiplexed real-time PCR test intended for the simultaneous qualitative detection and differentiation of SARS-CoV-2, Influenza A, Influenza B, and respiratory syncytial virus (RSV) viral RNA in either  nasopharyngeal swab or nasal swab specimens.         CBC W/ AUTO DIFFERENTIAL    Narrative:     The following orders were created for panel order CBC Auto Differential.  Procedure                               Abnormality         Status                     ---------                               -----------         ------                     CBC with Differential[[id number]]        Abnormal            Final result                 Please view results for these tests on the individual orders.   SARS-COV-2 RNA AMPLIFICATION, QUAL          Imaging Results              X-Ray Abdomen Flat And Erect (Final result)  Result time 07/22/24 11:38:24      Final result by Sabas Moreno MD (07/22/24 11:38:24)                   Impression:      No acute radiographic findings.      Electronically signed by: Sabas Moreno  Date:    07/22/2024  Time:    11:38               Narrative:    EXAMINATION:  XR ABDOMEN FLAT AND ERECT    CLINICAL HISTORY:  Nausea with vomiting, unspecified    COMPARISON:  17 November 2023.    FINDINGS:  Flat and upright views of the abdomen.  There is a nonspecific, nonobstructive bowel gas pattern.  No large stool burden or free air.  There are cholecystectomy clips.                                       Medications   lactated ringers bolus 1,000 mL (0 mLs Intravenous Stopped 7/22/24 1000)   metoclopramide injection 10 mg (10 mg Intravenous Given 7/22/24 0858)   diphenhydrAMINE injection 25 mg (25 mg Intravenous Given 7/22/24 0859)   ondansetron injection 4 mg (4 mg Intravenous Given 7/22/24 0858)   lactated ringers bolus 1,000 mL (0 mLs Intravenous Stopped 7/22/24 1222)     Medical Decision Making  43-year-old female presents with gastroenteritis symptoms nausea vomiting diarrhea abdominal cramps no fevers no chills no chest pain no shortness a breath vital signs were stable physical exam was unremarkable discussed labs x-rays with the patient and treatment plan she understands and agrees with the  plan    Amount and/or Complexity of Data Reviewed  Labs: ordered. Decision-making details documented in ED Course.  Radiology: ordered and independent interpretation performed.    Risk  Prescription drug management.  Risk Details: Differential diagnosis gastroenteritis COVID flu RSV viral syndrome               ED Course as of 07/22/24 1246   Mon Jul 22, 2024   1010 Magnesium : 1.60 [BL]   1010 CRP, High Sensitivity: 2.59 [BL]   1010 WBC(!): 4.23 [BL]   1010 Hemoglobin: 13.4 [BL]   1010 Hematocrit: 40.7 [BL]   1010 Potassium(!): 3.3 [BL]   1010 Sodium: 140 [BL]   1010 CO2: 28 [BL]   1010 Glucose: 98 [BL]   1010 Creatinine: 0.81 [BL]   1010 BUN: 11.5 [BL]   1059 Influenza A, Molecular: Not Detected [BL]   1059 Influenza B, Molecular: Not Detected [BL]   1059 SARS-CoV2 (COVID-19) Qualitative PCR: Not Detected [BL]   1117 Ketones, UA(!): >=160 [BL]   1117 Blood, UA(!): Trace-Lysed [BL]   1117 hCG Qualitative, Urine: Negative [BL]   1140 Benzodiazepine, Urine(!): Positive [BL]      ED Course User Index  [BL] Buster Armendariz MD                           Clinical Impression:  Final diagnoses:  [R11.2] Nausea & vomiting  [A08.4] Viral gastroenteritis (Primary)          ED Disposition Condition    Discharge Stable          ED Prescriptions       Medication Sig Dispense Start Date End Date Auth. Provider    ondansetron (ZOFRAN-ODT) 4 MG TbDL Take 1 tablet (4 mg total) by mouth every 6 (six) hours as needed. 12 tablet 7/22/2024 7/24/2024 Buster Armendariz MD          Follow-up Information       Follow up With Specialties Details Why Contact Info    Aleta Mendes NP Family Medicine In 2 days  [address] [facility] [address]  [phone]               Buster Armendariz MD  07/22/24 9748

## 2025-05-11 ENCOUNTER — HOSPITAL ENCOUNTER (EMERGENCY)
Facility: HOSPITAL | Age: 45
Discharge: HOME | End: 2025-05-11
Payer: COMMERCIAL

## 2025-05-11 VITALS
HEART RATE: 96 BPM | DIASTOLIC BLOOD PRESSURE: 90 MMHG | RESPIRATION RATE: 18 BRPM | HEIGHT: 62 IN | BODY MASS INDEX: 29.26 KG/M2 | OXYGEN SATURATION: 97 % | SYSTOLIC BLOOD PRESSURE: 136 MMHG | WEIGHT: 159 LBS | TEMPERATURE: 98 F

## 2025-05-11 DIAGNOSIS — K59.09 OTHER CONSTIPATION: ICD-10-CM

## 2025-05-11 DIAGNOSIS — R10.9 ABDOMINAL CRAMPS: Primary | ICD-10-CM

## 2025-05-11 DIAGNOSIS — R11.0 NAUSEA: ICD-10-CM

## 2025-05-11 DIAGNOSIS — R10.9 ABDOMINAL PAIN: ICD-10-CM

## 2025-05-11 LAB
ALBUMIN SERPL-MCNC: 3.6 G/DL (ref 3.5–5)
ALBUMIN/GLOB SERPL: 0.8 RATIO (ref 1.1–2)
ALP SERPL-CCNC: 47 UNIT/L (ref 40–150)
ALT SERPL-CCNC: 26 UNIT/L (ref 0–55)
ANION GAP SERPL CALC-SCNC: 9 MEQ/L
AST SERPL-CCNC: 36 UNIT/L (ref 11–45)
B-HCG UR QL: NEGATIVE
BACTERIA #/AREA URNS AUTO: ABNORMAL /HPF
BASOPHILS # BLD AUTO: 0.02 X10(3)/MCL
BASOPHILS NFR BLD AUTO: 0.2 %
BILIRUB SERPL-MCNC: 0.4 MG/DL
BILIRUB UR QL STRIP.AUTO: NEGATIVE
BUN SERPL-MCNC: 11.1 MG/DL (ref 7–18.7)
CALCIUM SERPL-MCNC: 9.6 MG/DL (ref 8.4–10.2)
CHLORIDE SERPL-SCNC: 105 MMOL/L (ref 98–107)
CLARITY UR: CLEAR
CO2 SERPL-SCNC: 24 MMOL/L (ref 22–29)
COLOR UR AUTO: ABNORMAL
CREAT SERPL-MCNC: 0.84 MG/DL (ref 0.55–1.02)
CREAT/UREA NIT SERPL: 13
EOSINOPHIL # BLD AUTO: 0.07 X10(3)/MCL (ref 0–0.9)
EOSINOPHIL NFR BLD AUTO: 0.7 %
ERYTHROCYTE [DISTWIDTH] IN BLOOD BY AUTOMATED COUNT: 12 % (ref 11.5–17)
GFR SERPLBLD CREATININE-BSD FMLA CKD-EPI: >60 ML/MIN/1.73/M2
GLOBULIN SER-MCNC: 4.5 GM/DL (ref 2.4–3.5)
GLUCOSE SERPL-MCNC: 103 MG/DL (ref 74–100)
GLUCOSE UR QL STRIP: NEGATIVE
HCT VFR BLD AUTO: 43.5 % (ref 37–47)
HGB BLD-MCNC: 14.5 G/DL (ref 12–16)
HGB UR QL STRIP: ABNORMAL
IMM GRANULOCYTES # BLD AUTO: 0.01 X10(3)/MCL (ref 0–0.04)
IMM GRANULOCYTES NFR BLD AUTO: 0.1 %
KETONES UR QL STRIP: NEGATIVE
LACTATE SERPL-SCNC: 0.9 MMOL/L (ref 0.5–2.2)
LEUKOCYTE ESTERASE UR QL STRIP: NEGATIVE
LIPASE SERPL-CCNC: 35 U/L
LYMPHOCYTES # BLD AUTO: 2.13 X10(3)/MCL (ref 0.6–4.6)
LYMPHOCYTES NFR BLD AUTO: 22.8 %
MCH RBC QN AUTO: 29.9 PG (ref 27–31)
MCHC RBC AUTO-ENTMCNC: 33.3 G/DL (ref 33–36)
MCV RBC AUTO: 89.7 FL (ref 80–94)
MONOCYTES # BLD AUTO: 0.56 X10(3)/MCL (ref 0.1–1.3)
MONOCYTES NFR BLD AUTO: 6 %
NEUTROPHILS # BLD AUTO: 6.57 X10(3)/MCL (ref 2.1–9.2)
NEUTROPHILS NFR BLD AUTO: 70.2 %
NITRITE UR QL STRIP: NEGATIVE
PH UR STRIP: 6 [PH]
PLATELET # BLD AUTO: 332 X10(3)/MCL (ref 130–400)
PMV BLD AUTO: 8.4 FL (ref 7.4–10.4)
POTASSIUM SERPL-SCNC: 4.1 MMOL/L (ref 3.5–5.1)
PROT SERPL-MCNC: 8.1 GM/DL (ref 6.4–8.3)
PROT UR QL STRIP: NEGATIVE
RBC # BLD AUTO: 4.85 X10(6)/MCL (ref 4.2–5.4)
RBC #/AREA URNS AUTO: ABNORMAL /HPF
SODIUM SERPL-SCNC: 138 MMOL/L (ref 136–145)
SP GR UR STRIP.AUTO: 1.01 (ref 1–1.03)
SQUAMOUS #/AREA URNS AUTO: ABNORMAL /HPF
UROBILINOGEN UR STRIP-ACNC: 0.2
WBC # BLD AUTO: 9.36 X10(3)/MCL (ref 4.5–11.5)
WBC #/AREA URNS AUTO: ABNORMAL /HPF

## 2025-05-11 PROCEDURE — 99284 EMERGENCY DEPT VISIT MOD MDM: CPT | Mod: 25

## 2025-05-11 PROCEDURE — 81003 URINALYSIS AUTO W/O SCOPE: CPT

## 2025-05-11 PROCEDURE — 63600175 PHARM REV CODE 636 W HCPCS

## 2025-05-11 PROCEDURE — 85025 COMPLETE CBC W/AUTO DIFF WBC: CPT

## 2025-05-11 PROCEDURE — 96374 THER/PROPH/DIAG INJ IV PUSH: CPT

## 2025-05-11 PROCEDURE — 83605 ASSAY OF LACTIC ACID: CPT

## 2025-05-11 PROCEDURE — 80053 COMPREHEN METABOLIC PANEL: CPT

## 2025-05-11 PROCEDURE — 83690 ASSAY OF LIPASE: CPT

## 2025-05-11 PROCEDURE — 96361 HYDRATE IV INFUSION ADD-ON: CPT

## 2025-05-11 PROCEDURE — 25000003 PHARM REV CODE 250

## 2025-05-11 PROCEDURE — 81025 URINE PREGNANCY TEST: CPT

## 2025-05-11 RX ORDER — SYRING-NEEDL,DISP,INSUL,0.3 ML 29 G X1/2"
296 SYRINGE, EMPTY DISPOSABLE MISCELLANEOUS ONCE
Qty: 296 ML | Refills: 0 | Status: SHIPPED | OUTPATIENT
Start: 2025-05-11 | End: 2025-05-11

## 2025-05-11 RX ORDER — SODIUM PHOSPHATE,MONO-DIBASIC 19G-7G/197
1 ENEMA (ML) RECTAL ONCE
Qty: 230 ML | Refills: 0 | Status: SHIPPED | OUTPATIENT
Start: 2025-05-11 | End: 2025-05-11

## 2025-05-11 RX ORDER — ACETAMINOPHEN 500 MG
1000 TABLET ORAL
Status: COMPLETED | OUTPATIENT
Start: 2025-05-11 | End: 2025-05-11

## 2025-05-11 RX ORDER — ONDANSETRON 4 MG/1
4 TABLET, ORALLY DISINTEGRATING ORAL 2 TIMES DAILY
Qty: 30 TABLET | Refills: 0 | Status: SHIPPED | OUTPATIENT
Start: 2025-05-11

## 2025-05-11 RX ORDER — ONDANSETRON HYDROCHLORIDE 2 MG/ML
4 INJECTION, SOLUTION INTRAVENOUS
Status: COMPLETED | OUTPATIENT
Start: 2025-05-11 | End: 2025-05-11

## 2025-05-11 RX ADMIN — ACETAMINOPHEN 1000 MG: 500 TABLET ORAL at 11:05

## 2025-05-11 RX ADMIN — SODIUM CHLORIDE 1000 ML: 9 INJECTION, SOLUTION INTRAVENOUS at 11:05

## 2025-05-11 RX ADMIN — ONDANSETRON 4 MG: 2 INJECTION INTRAMUSCULAR; INTRAVENOUS at 11:05

## 2025-05-11 NOTE — ED PROVIDER NOTES
Encounter Date: 5/11/2025       History     Chief Complaint   Patient presents with    Abdominal Pain     Pt reports been having problems with constipation took laxatives on Tuesday was able to go a little bit been having severe abdominal pain and nausea since yesterday . Hx of impaction     Patient is a 44-year-old female with past medical history of constipation.  Patient states she has taken some laxatives over the past few days and she is having increased abdominal cramping.  Patient notes she had a bowel movement this morning as well as some diarrhea.  She denies any previous small bowel obstructions or abdominal surgeries.  She denies any fever, vomiting or any other symptoms at this time.  Patient notes she has not seen her GI doctor in a long time.  She notes she has had similar abdominal cramps off the taking laxative medications in the past.        Review of patient's allergies indicates:  No Known Allergies  No past medical history on file.  No past surgical history on file.  No family history on file.  Social History[1]  Review of Systems   Gastrointestinal:  Positive for constipation and nausea.       Physical Exam     Initial Vitals [05/11/25 1107]   BP Pulse Resp Temp SpO2   (!) 136/90 96 18 98.3 °F (36.8 °C) 97 %      MAP       --         Physical Exam    Constitutional: She appears well-developed and well-nourished. She is not diaphoretic. No distress.   HENT:   Head: Normocephalic and atraumatic.   Eyes: EOM are normal.   Cardiovascular:  Normal rate and regular rhythm.           Pulmonary/Chest: Breath sounds normal. No respiratory distress.   Abdominal: Abdomen is soft. She exhibits no distension and no mass.   No significant tenderness to palpation of the abdomen There is no rebound and no guarding.   Musculoskeletal:         General: Normal range of motion.     Neurological: She is alert and oriented to person, place, and time.   Skin: Skin is warm and dry.         ED Course   Procedures  Labs  Reviewed   COMPREHENSIVE METABOLIC PANEL - Abnormal       Result Value    Sodium 138      Potassium 4.1      Chloride 105      CO2 24      Glucose 103 (*)     Blood Urea Nitrogen 11.1      Creatinine 0.84      Calcium 9.6      Protein Total 8.1      Albumin 3.6      Globulin 4.5 (*)     Albumin/Globulin Ratio 0.8 (*)     Bilirubin Total 0.4      ALP 47      ALT 26      AST 36      eGFR >60      Anion Gap 9.0      BUN/Creatinine Ratio 13     URINALYSIS, REFLEX TO URINE CULTURE - Abnormal    Color, UA Straw      Appearance, UA Clear      Specific Gravity, UA 1.010      pH, UA 6.0      Protein, UA Negative      Glucose, UA Negative      Ketones, UA Negative      Blood, UA Trace (*)     Bilirubin, UA Negative      Urobilinogen, UA 0.2      Nitrites, UA Negative      Leukocyte Esterase, UA Negative     URINALYSIS, MICROSCOPIC - Abnormal    Bacteria, UA None Seen      RBC, UA None Seen      WBC, UA None Seen      Squamous Epithelial Cells, UA Few (*)    LACTIC ACID, PLASMA - Normal    Lactic Acid Level 0.9     PREGNANCY TEST, URINE RAPID - Normal    hCG Qualitative, Urine Negative     LIPASE - Normal    Lipase Level 35     CBC W/ AUTO DIFFERENTIAL    Narrative:     The following orders were created for panel order CBC auto differential.  Procedure                               Abnormality         Status                     ---------                               -----------         ------                     CBC with Differential[[id number]]                           Final result                 Please view results for these tests on the individual orders.   CBC WITH DIFFERENTIAL    WBC 9.36      RBC 4.85      Hgb 14.5      Hct 43.5      MCV 89.7      MCH 29.9      MCHC 33.3      RDW 12.0      Platelet 332      MPV 8.4      Neut % 70.2      Lymph % 22.8      Mono % 6.0      Eos % 0.7      Basophil % 0.2      Imm Grans % 0.1      Neut # 6.57      Lymph # 2.13      Mono # 0.56      Eos # 0.07      Baso # 0.02      Imm Gran  # 0.01            Imaging Results              X-Ray Abdomen Flat And Erect (In process)                      Medications   sodium chloride 0.9% bolus 1,000 mL 1,000 mL (0 mLs Intravenous Stopped 5/11/25 1237)   ondansetron injection 4 mg (4 mg Intravenous Given 5/11/25 1138)   acetaminophen tablet 1,000 mg (1,000 mg Oral Given 5/11/25 1127)     Medical Decision Making  Amount and/or Complexity of Data Reviewed  Labs: ordered.  Radiology: ordered.    Risk  OTC drugs.  Prescription drug management.               ED Course as of 05/11/25 1256   Sun May 11, 2025   1139 Patient is a 44-year-old female with a past medical history of constipation.  She presents to emergency department today secondary to nausea as well as constipation.  Patient's vital signs in the ER within normal limits.  On physical exam patient does not have pinpoint tenderness to palpation of the abdomen without guarding or rigidity and is soft.  Differential diagnosis includes but is not limited to UTI, constipation, gastroenteritis, IBS, dehydration, electrolyte derangements.  We will order CBC CMP lactic acid pregnancy lipase urinalysis as well as IV fluids pain medication and a KUB in order to evaluate for the patient's constipation [KJ]   1255 Patient's x-ray does show some stool burden.  Patient notes she has some abdominal cramps however they feel similar to her previous abdominal surrounding her laxative use.  Patient feels improved in the ER after medications and fluids.  Advise patient to use prescribed medications to assist in bowel movement and to discontinue laxative use in excess of prescribed doses.  Advised her she should reestablish with her GI doctor, follow up with the primary care provider and return to the ER should she have worsening of her abdominal symptoms [KJ]      ED Course User Index  [KJ] Fatou Gilmore DO                           Clinical Impression:  Final diagnoses:  [R10.9] Abdominal pain  [R10.9] Abdominal cramps  (Primary)  [K59.09] Other constipation  [R11.0] Nausea          ED Disposition Condition    Discharge Stable          ED Prescriptions       Medication Sig Dispense Start Date End Date Auth. Provider    sodium phosphates (FLEET ENEMA EXTRA) 19-7 gram/197 mL Enem (Expires today) Place 1 Package rectally once. for 1 dose 230 mL 5/11/2025 5/11/2025 Fatou Gilmore DO    magnesium citrate solution (Expires today) Take 296 mLs by mouth once. for 1 dose 296 mL 5/11/2025 5/11/2025 Fatou Gilmore DO    ondansetron (ZOFRAN-ODT) 4 MG TbDL Take 1 tablet (4 mg total) by mouth 2 (two) times daily. 30 tablet 5/11/2025 -- Fatou Gilmore DO          Follow-up Information       Follow up With Specialties Details Why Contact Info    Aleta Mendes NP Family Medicine In 2 days  [address] [facility] [id number] [phone]      [facility] - Emergency Dept Emergency Medicine Go to  As needed, If symptoms worsen [address] [facility] [address] [phone]      In 1 week  Please follow-up with your gastroenterologist               [1]         Fatou Gilmore DO  05/11/25 7729

## 2025-05-11 NOTE — DISCHARGE INSTRUCTIONS
You came into the emergency department today secondary to constipation and abdominal cramps.  You received IV fluids in the emergency department.  Your lab work in the ER was overall reassuring.  Please follow up with your GI doctor.  Take prescribed medications for constipation.  Should your symptoms persist please return to the emergency department.  Please do not exceed the recommended dosages on your laxative medications.